# Patient Record
Sex: FEMALE | Race: AMERICAN INDIAN OR ALASKA NATIVE
[De-identification: names, ages, dates, MRNs, and addresses within clinical notes are randomized per-mention and may not be internally consistent; named-entity substitution may affect disease eponyms.]

---

## 2018-02-19 ENCOUNTER — HOSPITAL ENCOUNTER (EMERGENCY)
Dept: HOSPITAL 42 - ED | Age: 28
Discharge: HOME | End: 2018-02-19
Payer: MEDICAID

## 2018-02-19 VITALS
HEART RATE: 82 BPM | RESPIRATION RATE: 18 BRPM | DIASTOLIC BLOOD PRESSURE: 66 MMHG | SYSTOLIC BLOOD PRESSURE: 134 MMHG | OXYGEN SATURATION: 97 % | TEMPERATURE: 98.5 F

## 2018-02-19 DIAGNOSIS — L02.412: Primary | ICD-10-CM

## 2018-02-19 NOTE — ED PDOC
Arrival/HPI





- General


Time Seen by Provider: 02/19/18 14:46


Historian: Patient





- History of Present Illness


Narrative History of Present Illness (Text): 





02/19/18 14:46


28 y/o female, no significant pmh, nkda, c/o Lt. armpit pain and swelling x 1 

month with more severe pain for the past 2 days.  Pt. stated that she has 

chronic lump on the left armpit, more painful and swelling for the past 2 days, 

no fever or chills, no coughing or night sweat, no dizziness, no palpitation, 

no other medical or psychological complaints. 





Past Medical History





- Provider Review


Nursing Documentation Reviewed: Yes





Family/Social History





- Physician Review


Nursing Documentation Reviewed: Yes


Family/Social History: Unknown Family HX





Allergies/Home Meds


Allergies/Adverse Reactions: 


Allergies





Penicillins Allergy (Verified 02/19/18 15:31)


 RASH











Review of Systems





- Review of Systems


Constitutional: absent: Fatigue, Fevers


Eyes: absent: Vision Changes


ENT: absent: Hearing Changes


Respiratory: absent: SOB, Cough


Cardiovascular: absent: Chest Pain


Gastrointestinal: absent: Abdominal Pain, Nausea, Vomiting


Skin: Rash, Abscess.  absent: Pruritis, Skin Lesions, Laceration, Ulcer, 

Cellulitis


Neurological: absent: Headache, Dizziness


Psychiatric: absent: Anxiety, Depression, Suicidal Ideation





Physical Exam


Vital Signs











  Temp Pulse Resp BP Pulse Ox


 


 02/19/18 15:46  98.5 F  82  18  134/66  97














- Systems Exam


Head: Present: Atraumatic, Normocephalic


Pupils: Present: PERRL


Extroacular Muscles: Present: EOMI


Conjunctiva: Present: Normal


Neck: Present: Normal Range of Motion


Respiratory/Chest: Present: Clear to Auscultation, Good Air Exchange.  No: 

Respiratory Distress, Accessory Muscle Use


Cardiovascular: Present: Regular Rate and Rhythm, Normal S1, S2.  No: Murmurs


Abdomen: Present: Normal Bowel Sounds.  No: Tenderness, Distention, Peritoneal 

Signs


Back: Present: Normal Inspection


Upper Extremity: Present: Normal Inspection.  No: Cyanosis, Edema


Lower Extremity: Present: Normal Inspection.  No: Edema


Neurological: Present: GCS=15, CN II-XII Intact, Speech Normal, Motor Func 

Grossly Intact, Gait Normal, Memory Normal


Skin: Present: Warm, Dry, Rashes (Lt. armpit visible and palpable lump approx. 

[id number] with mild fluctuancy, no regional lymphenapathy, FROM without limitation

), Normal Color


Psychiatric: Present: Alert, Oriented x 3, Normal Insight, Normal Concentration





Medical Decision Making


ED Course and Treatment: 





02/19/18 15:38


-sensation intact, motor 5/5, wound irrigate with normal saline, clean with 

betadine, 1% lidocaine injected approx. 1cc for local analgesic, #11 blade made 

1.5cm incision, drained approx. 3 purulant discharge, irrigated with normal 

saline 20cc, iodofoam packing, gauze dressing, hemostasis obtained, sensation 

intact, motor 5/5


-Discharge home with clindamycin, naproxen for pain as needed, follow up with 

your own pmd and general surgeon within 2 day, return to the ER for any new or 

worsening signs or symptoms. 








- PA / NP / Resident Statement


MD/DO has reviewed & agrees with the documentation as recorded.





Disposition/Present on Arrival





- Present on Arrival


Any Indicators Present on Arrival: No


History of DVT/PE: No


History of Uncontrolled Diabetes: No


Urinary Catheter: No


History of Decub. Ulcer: No





- Disposition


Have Diagnosis and Disposition been Completed?: Yes


Diagnosis: 


 Axillary abscess





Disposition: HOME/ ROUTINE


Disposition Time: 15:41


Patient Plan: Discharge


Patient Problems: 


 Current Active Problems











Problem Status Onset


 


Axillary abscess Acute  











Condition: IMPROVED


Additional Instructions: 


-Discharge home with clindamycin, naproxen for pain as needed, follow up with 

your own pmd and general surgeon within 2 day, return to the ER for any new or 

worsening signs or symptoms. 


Prescriptions: 


Clindamycin [Cleocin] 300 mg PO TID #30 cap


Naproxen 500 mg PO BID PRN #20 tablet


 PRN Reason: Other


Referrals: 


Audi Christiansen MD [Medical Doctor] - Follow up with primary


[facility] [place] [Outside] - Follow up with primary


Forms:  WORK NOTE